# Patient Record
Sex: MALE | Race: WHITE
[De-identification: names, ages, dates, MRNs, and addresses within clinical notes are randomized per-mention and may not be internally consistent; named-entity substitution may affect disease eponyms.]

---

## 2019-12-05 ENCOUNTER — HOSPITAL ENCOUNTER (OUTPATIENT)
Dept: HOSPITAL 25 - ED | Age: 64
Setting detail: OBSERVATION
LOS: 1 days | Discharge: HOME | End: 2019-12-06
Attending: HOSPITALIST | Admitting: HOSPITALIST
Payer: MEDICARE

## 2019-12-05 DIAGNOSIS — S01.91XA: ICD-10-CM

## 2019-12-05 DIAGNOSIS — Z79.82: ICD-10-CM

## 2019-12-05 DIAGNOSIS — G54.0: ICD-10-CM

## 2019-12-05 DIAGNOSIS — S09.90XA: Primary | ICD-10-CM

## 2019-12-05 DIAGNOSIS — E78.5: ICD-10-CM
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
## 2019-12-05 DIAGNOSIS — Z87.442: ICD-10-CM
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
## 2019-12-05 DIAGNOSIS — R90.89: ICD-10-CM

## 2019-12-05 DIAGNOSIS — G47.33: ICD-10-CM

## 2019-12-05 DIAGNOSIS — G43.909: ICD-10-CM

## 2019-12-05 DIAGNOSIS — M19.90: ICD-10-CM

## 2019-12-05 DIAGNOSIS — G47.00: ICD-10-CM

## 2019-12-05 DIAGNOSIS — K21.9: ICD-10-CM

## 2019-12-05 DIAGNOSIS — E11.9: ICD-10-CM

## 2019-12-05 DIAGNOSIS — Z79.899: ICD-10-CM

## 2019-12-05 DIAGNOSIS — N40.0: ICD-10-CM

## 2019-12-05 DIAGNOSIS — J45.909: ICD-10-CM

## 2019-12-05 DIAGNOSIS — G89.29: ICD-10-CM

## 2019-12-05 DIAGNOSIS — R93.89: ICD-10-CM

## 2019-12-05 LAB
ALBUMIN SERPL BCG-MCNC: 4.2 G/DL (ref 3.2–5.2)
ALBUMIN/GLOB SERPL: 1.8 {RATIO} (ref 1–3)
ALP SERPL-CCNC: 77 U/L (ref 34–104)
ALT SERPL W P-5'-P-CCNC: 36 U/L (ref 7–52)
ANION GAP SERPL CALC-SCNC: 5 MMOL/L (ref 2–11)
APTT PPP: 31.5 SECONDS (ref 26–38)
AST SERPL-CCNC: 28 U/L (ref 13–39)
BASOPHILS # BLD AUTO: 0.1 10^3/UL (ref 0–0.2)
BUN SERPL-MCNC: 15 MG/DL (ref 6–24)
BUN/CREAT SERPL: 14.9 (ref 8–20)
CALCIUM SERPL-MCNC: 9.1 MG/DL (ref 8.6–10.3)
CHLORIDE SERPL-SCNC: 108 MMOL/L (ref 101–111)
EOSINOPHIL # BLD AUTO: 0.3 10^3/UL (ref 0–0.6)
GLOBULIN SER CALC-MCNC: 2.3 G/DL (ref 2–4)
GLUCOSE SERPL-MCNC: 101 MG/DL (ref 70–100)
HCO3 SERPL-SCNC: 28 MMOL/L (ref 22–32)
HCT VFR BLD AUTO: 42 % (ref 42–52)
HGB BLD-MCNC: 14.9 G/DL (ref 14–18)
INR PPP/BLD: 1.03 (ref 0.82–1.09)
LYMPHOCYTES # BLD AUTO: 1.8 10^3/UL (ref 1–4.8)
MCH RBC QN AUTO: 33 PG (ref 27–31)
MCHC RBC AUTO-ENTMCNC: 35 G/DL (ref 31–36)
MCV RBC AUTO: 93 FL (ref 80–94)
MONOCYTES # BLD AUTO: 0.6 10^3/UL (ref 0–0.8)
NEUTROPHILS # BLD AUTO: 5 10^3/UL (ref 1.5–7.7)
NRBC # BLD AUTO: 0 10^3/UL
NRBC BLD QL AUTO: 0.1
PLATELET # BLD AUTO: 168 10^3/UL (ref 150–450)
POTASSIUM SERPL-SCNC: 4.3 MMOL/L (ref 3.5–5)
PROT SERPL-MCNC: 6.5 G/DL (ref 6.4–8.9)
RBC # BLD AUTO: 4.54 10^6 /UL (ref 4.18–5.48)
SODIUM SERPL-SCNC: 141 MMOL/L (ref 135–145)
WBC # BLD AUTO: 7.7 10^3/UL (ref 3.5–10.8)

## 2019-12-05 PROCEDURE — 72070 X-RAY EXAM THORAC SPINE 2VWS: CPT

## 2019-12-05 PROCEDURE — 70553 MRI BRAIN STEM W/O & W/DYE: CPT

## 2019-12-05 PROCEDURE — 85610 PROTHROMBIN TIME: CPT

## 2019-12-05 PROCEDURE — 72100 X-RAY EXAM L-S SPINE 2/3 VWS: CPT

## 2019-12-05 PROCEDURE — 96374 THER/PROPH/DIAG INJ IV PUSH: CPT

## 2019-12-05 PROCEDURE — 90471 IMMUNIZATION ADMIN: CPT

## 2019-12-05 PROCEDURE — 90715 TDAP VACCINE 7 YRS/> IM: CPT

## 2019-12-05 PROCEDURE — 85025 COMPLETE CBC W/AUTO DIFF WBC: CPT

## 2019-12-05 PROCEDURE — 96376 TX/PRO/DX INJ SAME DRUG ADON: CPT

## 2019-12-05 PROCEDURE — 12001 RPR S/N/AX/GEN/TRNK 2.5CM/<: CPT

## 2019-12-05 PROCEDURE — 70450 CT HEAD/BRAIN W/O DYE: CPT

## 2019-12-05 PROCEDURE — 70496 CT ANGIOGRAPHY HEAD: CPT

## 2019-12-05 PROCEDURE — 72125 CT NECK SPINE W/O DYE: CPT

## 2019-12-05 PROCEDURE — 85730 THROMBOPLASTIN TIME PARTIAL: CPT

## 2019-12-05 PROCEDURE — A9579 GAD-BASE MR CONTRAST NOS,1ML: HCPCS

## 2019-12-05 PROCEDURE — 80053 COMPREHEN METABOLIC PANEL: CPT

## 2019-12-05 PROCEDURE — 36415 COLL VENOUS BLD VENIPUNCTURE: CPT

## 2019-12-05 PROCEDURE — G0378 HOSPITAL OBSERVATION PER HR: HCPCS

## 2019-12-05 PROCEDURE — 99283 EMERGENCY DEPT VISIT LOW MDM: CPT

## 2019-12-05 RX ADMIN — LEVETIRACETAM SCH MG: 500 TABLET, FILM COATED ORAL at 21:27

## 2019-12-05 RX ADMIN — GABAPENTIN SCH MG: 300 CAPSULE ORAL at 21:27

## 2019-12-05 RX ADMIN — CARBAMAZEPINE SCH MG: 200 TABLET ORAL at 21:27

## 2019-12-05 NOTE — XMS REPORT
Continuity of Care Document (CCD)

 Created on:2019



Patient:Sarwat Reyna

Sex:Male

:1955

External Reference #:[id number]





Demographics







 Address  [address] [place] [address]

 

 Home Phone  [phone]

 

 Mobile Phone  [phone]

 

 Work Phone  [phone]

 

 Email Address  [email or web address]

 

 Preferred Language  Unknown

 

 Marital Status  Unknown

 

 [employer] Affiliation  Unknown

 

 Race  Unknown

 

 Ethnic Group  Unknown









Author







 Name  ANNAMARIE Yan (transmitted by agent of provider Urban Oleary)

 

 Address  [address] [place] [address]









Care Team Providers







 Name  Role  Phone

 

 Jeremy Thurman M.D.  Care Team Information   [phone]









Problems







 Active Problems  Provider  Date

 

 Allergic rhinitis due to pollen  Urban Oleary M.D.  Onset: 2019

 

 Allergic rhinitis due to animals  Urban Oleary M.D.  Onset: 2019

 

 Allergic rhinitis  Urban Oleary M.D.  Onset: 2019







Social History







 Type  Date  Description  Comments

 

 Birth Sex    Unknown  

 

 Tobacco Use  Start: Unknown  Patient has never smoked  

 

 Recreational Drug Use    Denies Drug Use  







Allergies, Adverse Reactions, Alerts







 Description

 

 No Known Drug Allergies







Medications







 Active Medications  SIG  Qnty  Indications  Ordering  Date



         Provider  

 

 Ventolin HFA  2 every 4 hours  18gm  R06.02  Gail Lucio,  2017



         108(90Base)  as needed      FNP-C  



 mcg/Act Aerosol          



           

 

 Levocetirizine  Take 1 Tablet  60tabs  J30.89  Gail Lucio,  2017



 Dihydrochloride  By Mouth Twice      FNP-C  



            5mg Tablets  Daily        



           

 

 Atorvastatin Calcium        Unknown  



                 10mg          



 Tablets          

 

 Metformin HCL ER        Unknown  



             500mg          



 Tablets ER 24HR          



           

 

 Cyclobenzaprine HCL        Unknown  



                5mg          



 Tablets          

 

 Trazodone HCL        Unknown  



          50mg Tablets          



           

 

 Naproxen        Unknown  



     250mg Tablets          



           

 

 Zolpidem Tartrate        Unknown  



              10mg          



 Tablets          

 

 Carbamazepine  (3) Tabs- 600      Unknown  



          200mg Tablets  MG Twice Daily        



           

 

 Amitriptyline HCL        Unknown  



               Tablets          



           

 

 Aspir-81        Unknown  



     81mg Tablets DR          



           

 

 Vitamin D        Unknown  



      1000Unit Tablets          



           

 

 Gabapentin        Unknown  



       300mg Capsules          



           

 

 Losartan Potassium  one tablet once      Unknown  



               25mg  a day        



 Tablets          

 

 Motrin Ib  As needed.      Unknown  



      200mg Capsules          



           







Medications Administered in Office







 Medication  SIG  Qnty  Indications  Ordering Provider  Date

 

 Injection        Allergy Injection  10/17/2019



     Injection          



           

 

 Injection        Allergy Injection  10/09/2019



     Injection          



           

 

 Injection        Allergy Injection  2019



     Injection          



           

 

 Injection        Allergy Injection  2019



     Injection          



           

 

 Injection        Allergy Injection  2019



     Injection          



           

 

 Injection        Allergy Injection  08/15/2019



     Injection          



           

 

 Injection        Allergy Injection  2019



     Injection          



           

 

 Injection        Allergy Injection  2019



     Injection          



           

 

 Injection        Allergy Injection  2019



     Injection          



           

 

 Injection        Allergy Injection  2019



     Injection          



           

 

 Injection        Allergy Injection  2019



     Injection          



           

 

 Injection        Allergy Injection  2019



     Injection          



           

 

 Injection        Allergy Injection  2019



     Injection          



           

 

 Injection        Allergy Injection  2019



     Injection          



           

 

 Injection        Allergy Injection  2019



     Injection          



           

 

 Injection        Allergy Injection  2019



     Injection          



           

 

 Injection        Allergy Injection  2019



     Injection          



           

 

 Injection        Allergy Injection  2019



     Injection          



           

 

 Injection        Allergy Injection  2018



     Injection          



           

 

 Injection        Allergy Injection  2018



     Injection          



           

 

 Injection        Allergy Injection  11/15/2018



     Injection          



           

 

 Injection        Allergy Injection  10/29/2018



     Injection          



           

 

 Injection        Allergy Injection  10/15/2018



     Injection          



           

 

 Injection        Allergy Injection  10/08/2018



     Injection          



           

 

 Injection        Allergy Injection  2018



     Injection          



           

 

 Injection        Allergy Injection  2018



     Injection          



           

 

 Injection        Allergy Injection  2018



     Injection          



           

 

 Injection        Allergy Injection  2018



     Injection          



           

 

 Injection        Urban Oleary M.D.  2018



     Injection          



           

 

 Injection        Allergy Injection  2018



     Injection          



           

 

 Injection        Urban Oleary M.D.  2018



     Injection          



           

 

 Injection        Allergy Injection  2018



     Injection          



           

 

 Injection        Allergy Injection  2018



     Injection          



           

 

 Injection        Allergy Injection  2018



     Injection          



           

 

 Injection        Allergy Injection  2018



     Injection          



           

 

 Injection        Allergy Injection  2018



     Injection          



           

 

 Injection        Allergy Injection  2018



     Injection          



           

 

 Injection        Allergy Injection  2018



     Injection          



           

 

 Injection        Allergy Injection  2018



     Injection          



           

 

 Injection        Allergy Injection  2018



     Injection          



           

 

 Injection        Allergy Injection  2018



     Injection          



           

 

 Injection        Allergy Injection  2018



     Injection          



           

 

 Injection        Allergy Injection  2018



     Injection          



           

 

 Injection        Allergy Injection  2018



     Injection          



           

 

 Injection        Allergy Injection  2018



     Injection          



           

 

 Injection        Allergy Injection  2017



     Injection          



           

 

 Injection        Allergy Injection  2017



     Injection          



           

 

 Injection        Allergy Injection  2017



     Injection          



           

 

 Injection        Allergy Injection  10/23/2017



     Injection          



           

 

 Injection        Allergy Injection  10/02/2017



     Injection          



           

 

 Injection        Allergy Injection  10/02/2017



     Injection          



           

 

 Injection        Urban Oleary M.D.  09/15/2017



     Injection          



           

 

 Injection        Allergy Injection  09/15/2017



     Injection          



           

 

 Injection        Allergy Injection  2017



     Injection          



           

 

 Injection        Allergy Injection  2017



     Injection          



           

 

 Injection        Allergy Injection  2017



     Injection          



           

 

 Injection        Allergy Injection  2017



     Injection          



           

 

 Injection        Allergy Injection  2017



     Injection          



           

 

 Injection        Allergy Injection  2017



     Injection          



           

 

 Injection        Allergy Injection  2017



     Injection          



           

 

 Injection        Urban Oleary M.D.  2017



     Injection          



           

 

 Injection        Allergy Injection  2017



     Injection          



           

 

 Injection        Allergy Injection  2017



     Injection          



           

 

 Injection        Allergy Injection  2017



     Injection          



           

 

 Injection        Allergy Injection  2017



     Injection          



           

 

 Injection        Allergy Injection  2017



     Injection          



           

 

 Injection        Allergy Injection  2017



     Injection          



           

 

 Injection        Allergy Injection  2017



     Injection          



           

 

 Injection        Allergy Injection  2017



     Injection          



           

 

 Injection        Allergy Injection  2017



     Injection          



           

 

 Injection        Allergy Injection  2017



     Injection          



           

 

 Injection        Allergy Injection  2017



     Injection          



           

 

 Injection        Allergy Injection  2017



     Injection          



           

 

 Injection        Allergy Injection  2016



     Injection          



           

 

 Injection        Allergy Injection  2016



     Injection          



           

 

 Injection        Allergy Injection  2016



     Injection          



           

 

 Injection        Allergy Injection  10/17/2016



     Injection          



           

 

 Injection        Allergy Injection  10/03/2016



     Injection          



           

 

 Injection        Allergy Injection  2016



     Injection          



           

 

 Injection        Allergy Injection  2016



     Injection          



           

 

 Injection        Allergy Injection  2016



     Injection          



           

 

 Injection        Allergy Injection  2016



     Injection          



           

 

 Injection        Allergy Injection  2016



     Injection          



           

 

 Injection        Allergy Injection  2016



     Injection          



           

 

 Injection        Allergy Injection  2016



     Injection          



           

 

 Injection        Allergy Injection  2016



     Injection          



           

 

 Injection        Allergy Injection  2016



     Injection          



           

 

 Injection        Allergy Injection  2016



     Injection          



           

 

 Injection        Allergy Injection  2016



     Injection          



           

 

 Injection        Allergy Injection  2016



     Injection          



           

 

 Injection        Allergy Injection  2016



     Injection          



           

 

 Injection        Allergy Injection  2016



     Injection          



           

 

 Injection        Allergy Injection  2016



     Injection          



           

 

 Injection        Allergy Injection  2015



     Injection          



           

 

 Injection        Allergy Injection  2015



     Injection          



           

 

 Injection        Allergy Injection  2015



     Injection          



           

 

 Injection        Allergy Injection  10/21/2015



     Injection          



           

 

 Injection        Allergy Injection  10/07/2015



     Injection          



           

 

 Injection        Allergy Injection  2015



     Injection          



           

 

 Injection        Allergy Injection  2015



     Injection          



           

 

 Injection        Allergy Injection  2015



     Injection          



           

 

 Injection        Allergy Injection  08/10/2015



     Injection          



           

 

 Injection        Allergy Injection  2015



     Injection          



           

 

 Injection        Allergy Injection  07/10/2015



     Injection          



           

 

 Injection        Allergy Injection  2015



     Injection          



           

 

 Injection        Allergy Injection  2015



     Injection          



           

 

 Injection        Allergy Injection  2015



     Injection          



           

 

 Injection        Allergy Injection  2015



     Injection          



           

 

 Injection        Allergy Injection  2015



     Injection          



           

 

 Injection        Allergy Injection  04/10/2015



     Injection          



           

 

 Injection        Allergy Injection  2015



     Injection          



           

 

 Injection        Allergy Injection  2015



     Injection          



           

 

 Injection        Allergy Injection  2015



     Injection          



           

 

 Injection        Allergy Injection  2015



     Injection          



           

 

 Injection        Allergy Injection  2015



     Injection          



           

 

 Injection        Allergy Injection  12/15/2014



     Injection          



           

 

 Injection        Allergy Injection  2014



     Injection          



           

 

 Injection        Allergy Injection  10/20/2014



     Injection          



           

 

 Injection        Allergy Injection  10/08/2014



     Injection          



           

 

 Injection        Allergy Injection  2014



     Injection          



           

 

 Injection        Allergy Injection  2014



     Injection          



           

 

 Injection        Allergy Injection  2014



     Injection          



           

 

 Injection        Allergy Injection  2014



     Injection          



           

 

 Injection        Allergy Injection  2014



     Injection          



           

 

 Injection        Allergy Injection  2014



     Injection          



           

 

 Injection        Allergy Injection  2014



     Injection          



           

 

 Injection        Allergy Injection  2014



     Injection          



           

 

 Injection        Allergy Injection  2014



     Injection          



           

 

 Injection        Allergy Injection  2014



     Injection          



           

 

 Injection        Allergy Injection  2014



     Injection          



           

 

 Injection        Allergy Injection  2014



     Injection          



           

 

 Injection        Allergy Injection  2014



     Injection          



           

 

 Injection        Allergy Injection  2014



     Injection          



           

 

 Injection        Allergy Injection  2014



     Injection          



           

 

 Injection        Allergy Injection  2014



     Injection          



           

 

 Injection        Allergy Injection  2013



     Injection          



           

 

 Injection        Allergy Injection  2013



     Injection          



           

 

 Injection        Allergy Injection  2013



     Injection          



           

 

 Injection        Allergy Injection  10/16/2013



     Injection          



           

 

 Injection        Allergy Injection  10/02/2013



     Injection          



           

 

 Injection        Allergy Injection  2013



     Injection          



           

 

 Injection        Allergy Injection  2013



     Injection          



           

 

 Injection        Allergy Injection  2013



     Injection          



           

 

 Injection        Allergy Injection  2013



     Injection          



           

 

 Injection        Allergy Injection  2013



     Injection          



           

 

 Injection        Allergy Injection  2013



     Injection          



           

 

 Injection        Allergy Injection  2013



     Injection          



           

 

 Injection        Allergy Injection  2013



     Injection          



           

 

 Injection        Allergy Injection  2013



     Injection          



           

 

 Injection        Allergy Injection  05/15/2013



     Injection          



           

 

 Injection        Allergy Injection  2013



     Injection          



           

 

 Injection        Allergy Injection  2013



     Injection          



           

 

 Injection        Allergy Injection  2013



     Injection          



           

 

 Injection        Allergy Injection  2013



     Injection          



           

 

 Injection        Mic Rubinstein, M.DDeann  2013



     Injection          



           

 

 Injection        Mic Rubinstein, M.DDeann  2013



     Injection          



           

 

 Injection        Mic Rubinstein, M.DDeann  2013



     Injection          



           

 

 Injection        Mic Rubinstein, M.DDeann  2013



     Injection          



           

 

 Injection        Mic Rubinstein, M.DDeann  2013



     Injection          



           

 

 Injection        Mic Rubinstein, M.DDeann  2013



     Injection          



           

 

 Injection        Mic Rubinstein, M.DDeann  2012



     Injection          



           

 

 Injection        Mic Rubinstein, M.DDeann  2012



     Injection          



           

 

 Injection        Mic Rubinstein, M.DDeann  2012



     Injection          



           

 

 Injection        Mic Rubinstein, M.DDeann  2012



     Injection          



           

 

 Injection        Mic Rubinstein, M.DDeann  10/19/2012



     Injection          



           

 

 Injection        Mic Rubinstein, M.DDeann  2012



     Injection          



           

 

 Injection        Mic Rubinstein, YUMI.DDeann  2012



     Injection          



           

 

 Injection        Mic Rubinstein, M.DDeann  2012



     Injection          



           

 

 Injection        Mic Rubinstein, M.DDeann  2012



     Injection          



           

 

 Injection        Mic Rubinstein, M.DDeann  2012



     Injection          



           

 

 Injection        Mic Rubinstein, M.DDeann  2012



     Injection          



           

 

 Injection        Mic Rubinstein, M.DDeann  2012



     Injection          



           

 

 Injection        Mic Rubinstein, M.DDeann  2012



     Injection          



           

 

 Injection        Mic Rubinstein, M.DDeann  2012



     Injection          



           

 

 Injection        Mic Rubinstein, M.DDeann  2012



     Injection          



           

 

 Injection        Mic Rubinstein, M.DDeann  2012



     Injection          



           

 

 Injection        Mic Rubinstein, M.DDeann  2012



     Injection          



           

 

 Injection        Mic Rubinstein, M.DDeann  2012



     Injection          



           

 

 Injection        Mic Rubinstein, M.DDeann  2012



     Injection          



           

 

 Injection        Mic Rubinstein, M.DDeann  2012



     Injection          



           

 

 Injection        Mic Rubinstein, M.DDeann  2012



     Injection          



           

 

 Injection        Mic Rubinstein, M.DDeann  2011



     Injection          



           

 

 Injection        Mic Rubinstein, M.DDeann  2011



     Injection          



           

 

 Injection        Mic Rubinstein, M.DDeann  2011



     Injection          



           

 

 Injection        Mic Rubinstein, M.DDeann  10/21/2011



     Injection          



           

 

 Injection        Mic Rubinstein, M.DDeann  10/05/2011



     Injection          



           

 

 Injection        Mic Rubinstein, M.DDeann  2011



     Injection          



           

 

 Injection        Mic Rubinstein, M.DDeann  2011



     Injection          



           

 

 Injection        Mic Rubinstein, M.DDeann  2011



     Injection          



           

 

 Injection        Mic Rubinstein, M.DDeann  08/10/2011



     Injection          



           

 

 Injection        Mic Rubinstein, M.DDeann  2011



     Injection          



           

 

 Injection        Mic Rubinstein, M.DDeann  2011



     Injection          



           

 

 Injection        Mic Rubinstein, M.DDeann  2011



     Injection          



           

 

 Injection        Mic Rubinstein, YUMI.DDeann  06/15/2011



     Injection          



           

 

 Injection        Mic Rubinstein, YUMI.DDeann  2011



     Injection          



           

 

 Injection        Mic Rubinstein, YUMI.DDeann  2011



     Injection          



           

 

 Injection        Mic Rubinstein, M.DDeann  2011



     Injection          



           

 

 Injection        Mic Rubinstein, M.DDeann  2011



     Injection          



           

 

 Injection        Mic Rubinstein, M.DDeann  2011



     Injection          



           

 

 Injection        Mic Rubinstein, M.DDeann  2011



     Injection          



           

 

 Injection        Mic Rubinstein, M.DDeann  2011



     Injection          



           

 

 Injection        Mic Rubinstein, M.DDeann  2011



     Injection          



           

 

 Injection        Mic Rubinstein, M.DDeann  2011



     Injection          



           

 

 Injection        Mic Rubinstein, M.DDeann  2011



     Injection          



           

 

 Injection        Mic Rubinstein, M.DDeann  2010



     Injection          



           

 

 Injection        Mic Rubinstein, M.DDeann  2010



     Injection          



           

 

 Injection        Mic Rubinstein, M.DDeann  2010



     Injection          



           

 

 Injection        Mic Rubinstein, M.DDeann  10/20/2010



     Injection          



           

 

 Injection        Mic Rubinstein, M.DDeann  10/06/2010



     Injection          



           

 

 Injection        Mic Rubinstein, M.DDeann  2010



     Injection          



           

 

 Injection        Mic Rubinstein, M.DDeann  09/10/2010



     Injection          



           

 

 Injection        Mic Rubinstein, M.DDeann  2010



     Injection          



           

 

 Injection        Mic Rubinstein, M.DDeann  2010



     Injection          



           

 

 Injection        Mic Rubinstein, MDeannDDeann  2010



     Injection          



           

 

 Injection        Mic Rubinstein, MDeannDDeann  2010



     Injection          



           

 

 Injection        Mic Rubinstein, MDeannDDeann  2010



     Injection          



           

 

 Injection        Mic Rubinstein, M.DDeann  2010



     Injection          



           

 

 Injection        Mic Rubinstein, NESSADDeann  2010



     Injection          



           

 

 Injection        Mic Rubinstein, MDeannDDeann  2010



     Injection          



           

 

 Injection        Mic Rubinstein, NESSADDeann  2010



     Injection          



           

 

 Injection        Mic Rubinstein, NESSADDeann  2010



     Injection          



           

 

 Injection        Mic Rubinstein, M.D.  2010



     Injection          



           

 

 Injection        Mic Rubinstein, NESSADDeann  2010



     Injection          



           

 

 Injection        Mic Rubinstein, NESSADDeann  2010



     Injection          



           

 

 Injection        Kirit Haney M.D.  2010



     Injection          



           

 

 Injection        Kirit Haney M.D.  2010



     Injection          



           

 

 Injection        Mic Rubinstein, M.D.  2009



     Injection          



           

 

 Injection        Mic Rubinstein, M.D.  2009



     Injection          



           

 

 Injection        Mic Rubinstein, M.D.  2009



     Injection          



           

 

 Injection        Mic Rubinstein, M.D.  2009



     Injection          



           

 

 Injection        Mic Rubinstein, MDeannDDeann  10/14/2009



     Injection          



           

 

 Injection        Mic Rubinstein, M.D.  2009



     Injection          



           

 

 Injection        Mic Rubinstein, M.D.  2009



     Injection          



           

 

 Injection        Mic Rubinstein, M.DDeann  2009



     Injection          



           

 

 Injection        Mic Rubinstein, M.DDeann  2009



     Injection          



           

 

 Injection        Mic Rubinstein, MDeannDDeann  2009



     Injection          



           

 

 Injection        Mic Rubinstein, M.DDeann  2009



     Injection          



           

 

 Injection        Mic Rubinstein, M.DDeann  07/10/2009



     Injection          



           

 

 Injection        Mic Rubinstein, M.DDeann  2009



     Injection          



           

 

 Injection        Mic Rubinstein, MNAA  2009



     Injection          



           

 

 Injection        Mic Rubinstein, M.D.  2009



     Injection          



           

 

 Injection        Mic Rubinstein, M.D.  05/15/2009



     Injection          



           

 

 Injection        Mic Rubinstein, M.D.  2009



     Injection          



           

 

 Injection        Mic Rubinstein, M.D.  2009



     Injection          



           

 

 Injection        Mic Rubinstein, M.D.  2009



     Injection          



           

 

 Injection        Mic Rubinstein, M.D.  2009



     Injection          



           

 

 Injection        Mic Rubinstein, M.D.  2009



     Injection          



           

 

 Injection        Mic Rubinstein, M.D.  2009



     Injection          



           

 

 Injection        Mic Rubinstein, M.D.  2009



     Injection          



           

 

 Injection        Mic Rubinstein, M.DDeann  2009



     Injection          



           

 

 Injection        Mic Rubinstein, M.DDeann  12/10/2008



     Injection          



           

 

 Injection        Mic Rubinstein, M.D.  2008



     Injection          



           

 

 Injection        Mic Rubinstein, M.D.  10/27/2008



     Injection          



           

 

 Injection        Mic Rubinstein, M.DDeann  10/15/2008



     Injection          



           

 

 Injection        Mic Rubinstein, M.DDeann  10/03/2008



     Injection          



           

 

 Injection        Mic Rubinstein, M.DDeann  2008



     Injection          



           

 

 Injection        Mic Rubinstein, M.D.  2008



     Injection          



           

 

 Injection        Mic Rubinstein, M.DDeann  2008



     Injection          



           

 

 Injection        Mic Rubinstein, M.D.  2008



     Injection          



           

 

 Injection        Mic Rubinstein, M.D.  2008



     Injection          



           

 

 Injection        Mic Rubinstein, M.DDeann  2008



     Injection          



           

 

 Injection        Mic Rubinstein, M.D.  2008



     Injection          



           

 

 Injection        Mic Rubinstein, M.D.  2008



     Injection          



           

 

 Injection        Mic Rubinstein, M.DDeann  2008



     Injection          



           

 

 Injection        Mic Rubinstein, M.DDeann  2008



     Injection          



           

 

 Injection        Mic Rubinstein, M.D.  2008



     Injection          



           

 

 Injection        Mic Rubinstein, M.D.  2008



     Injection          



           

 

 Injection        Mic Rubinstein, M.DDeann  2008



     Injection          



           

 

 Injection        Mic Rubinstein, M.DDeann  2008



     Injection          



           

 

 Injection        Mic Rubinstein, M.DDeann  2008



     Injection          



           

 

 Injection        Mic Rubinstein, M.DDeann  2008



     Injection          



           

 

 Injection        Mic Rubinstein, M.DDeann  2008



     Injection          



           

 

 Injection        Mic Rubinstein, M.DDeann  2008



     Injection          



           

 

 Injection        Mic Rubinstein, M.DDaenn  2007



     Injection          



           

 

 Injection        Mic Rubinstein, M.DDeann  2007



     Injection          



           

 

 Injection        Mic Rubinstein, M.DDeann  2007



     Injection          



           

 

 Injection        Mic Rubinstein, M.DDeann  2007



     Injection          



           

 

 Injection        Mic Rubinstein, M.DDeann  10/17/2007



     Injection          



           

 

 Injection        Mic Rubinstein, M.DDeann  10/03/2007



     Injection          



           

 

 Injection        Mic Rubinstein, M.DDeann  2007



     Injection          



           

 

 Injection        Mic Rubinstein, M.DDeann  2007



     Injection          



           

 

 Injection        Mic Rubinstein, M.DDeann  2007



     Injection          



           

 

 Injection        Mic Rubinstein, M.DDeann  08/10/2007



     Injection          



           

 

 Injection        Mic Rubinstein, M.DDeann  2007



     Injection          



           

 

 Injection        Mic Rubinstein, M.DDeann  2007



     Injection          



           

 

 Injection        Mic Rubinstein, M.DDeann  2007



     Injection          



           

 

 Injection        Mic Rubinstein, M.DDeann  06/15/2007



     Injection          



           

 

 Injection        Mic Rubinstein, M.DDeann  2007



     Injection          



           

 

 Injection        Mic Rubinstein, M.DDeann  2007



     Injection          



           

 

 Injection        Mic Rubinstein, M.DDeann  2007



     Injection          



           

 

 Injection        Mic Rubinstein, M.DDeann  2007



     Injection          



           

 

 Injection        Mic Rubinstein, M.DDeann  2007



     Injection          



           

 

 Injection        Mic Rubinstein, M.DDeann  2007



     Injection          



           

 

 Injection        Mic Rubinstein, M.DDeann  2007



     Injection          



           

 

 Injection        Mic Rubinstein, M.DDeann  2007



     Injection          



           

 

 Injection        Mic Rubinstein, M.DDeann  2007



     Injection          



           

 

 Injection        Mic Rubinstein, M.DDeann  01/10/2007



     Injection          



           

 

 Injection        Mic Rubinstein, M.DDeann  2006



     Injection          



           

 

 Injection        Mic Rubinstein, M.DDeann  2006



     Injection          



           

 

 Injection        Mic Rubinstein, M.DDeann  2006



     Injection          



           

 

 Injection        Mic Rubinstein, M.DDeann  10/23/2006



     Injection          



           

 

 Injection        Mic Rubinstein, M.DDeann  10/09/2006



     Injection          



           

 

 Injection        Mic Rubinstein, M.DDeann  2006



     Injection          



           

 

 Injection        Mic Rubinstein, M.DDeann  2006



     Injection          



           

 

 Injection        Mic Rubinstein, M.DDeann  2006



     Injection          



           

 

 Injection        Mic Rubinstein, M.DDeann  2006



     Injection          



           

 

 Injection        Mic Rubinstein, M.DDeann  2006



     Injection          



           

 

 Injection        Mic Rubinstein, M.DDeann  2006



     Injection          



           

 

 Injection        Mic Rubinstein, M.DDeann  2006



     Injection          



           

 

 Injection        Mic Rubinstein, M.DDeann  2006



     Injection          



           

 

 Injection        Mic Rubinstein, M.DDeann  2006



     Injection          



           

 

 Injection        Mic Rubinstein, M.DDeann  2006



     Injection          



           

 

 Injection        Mic Rubinstein, YUMI.DDeann  05/10/2006



     Injection          



           

 

 Injection        Mic Rubinstein, YUMI.DDeann  2006



     Injection          



           

 

 Injection        Mic Rubinstein, YUMI.DDeann  04/10/2006



     Injection          



           

 

 Injection        Mic Rubinstein, M.DDeann  2006



     Injection          



           

 

 Injection        Mic Rubinstein, M.DDeann  03/10/2006



     Injection          



           

 

 Injection        Mic Rubinstein, M.DDeann  2006



     Injection          



           

 

 Injection        Mic Rubinstein, M.DDeann  2006



     Injection          



           

 

 Injection        Mic Rubinstein, YUMI.DDeann  2006



     Injection          



           

 

 Injection        Mic Rubinstein, M.DDeann  2005



     Injection          



           

 

 Injection        Mic Rubinstein, M.DDeann  2005



     Injection          



           

 

 Injection        Mic Rubinstein, M.DDeann  2005



     Injection          



           

 

 Injection        Mic Rubinstein, M.DDeann  2005



     Injection          



           

 

 Injection        Mic Rubinstein, M.DDeann  10/19/2005



     Injection          



           

 

 Injection        Mic Rubinstein, M.DDeann  10/07/2005



     Injection          



           

 

 Injection        Mic Rubinstein, M.DDeann  2005



     Injection          



           

 

 Injection        Mic Rubinstein, M.DDeann  2005



     Injection          



           

 

 Injection        Mic Rubinstein, M.DDeann  2005



     Injection          



           

 

 Injection        Mic Rubinstein, M.D.  08/15/2005



     Injection          



           

 

 Injection        Mic Rubinstein, M.D.  2005



     Injection          



           

 

 Injection        Mic Rubinstein, M.D.  2005



     Injection          



           

 

 Injection        Mic Rubinstein, M.D.  2005



     Injection          



           

 

 Injection        Mic Rubinstein, M.DDeann  2005



     Injection          



           

 

 Injection        Mic Rubinstein, M.DDeann  2005



     Injection          



           

 

 Injection        Mic Rubinstein, M.DDeann  2005



     Injection          



           

 

 Injection        Mic Rubinstein, M.DDeann  2005



     Injection          



           

 

 Injection        Mic Rubinstein, M.DDeann  2005



     Injection          



           

 

 Injection        Mic Rubinstein, M.DDeann  2005



     Injection          



           

 

 Injection        Mic Rubinstein, M.DDeann  2005



     Injection          



           

 

 Injection        Mic Rubinstein, M.DDeann  2005



     Injection          



           

 

 Injection        Mic Rubinstein, M.DDeann  2005



     Injection          



           

 

 Injection        Mic Rubinstein, M.DDeann  2005



     Injection          



           

 

 Injection        Mic Rubinstein, M.DDeann  2005



     Injection          



           

 

 Injection        Mic Rubinstein, M.DDeann  2005



     Injection          



           

 

 Injection        Mic Rubinstein, M.DDeann  2004



     Injection          



           

 

 Injection        Mic Rubinstein, M.DDeann  2004



     Injection          



           

 

 Injection        Mic Rubinstein, M.DDeann  2004



     Injection          



           

 

 Injection        Mic Rubinstein, M.DDeann  10/22/2004



     Injection          



           

 

 Injection        Mic Rubinstein, M.DDeann  10/04/2004



     Injection          



           

 

 Injection        Mic Rubinstein, M.DDeann  2004



     Injection          



           

 

 Injection        Mic Rubinstein, M.DDeann  2004



     Injection          



           

 

 Injection        Mic Rubinstein, M.DDeann  2004



     Injection          



           

 

 Injection        Mic Rubinstein, M.DDeann  2004



     Injection          



           

 

 Injection        Mic Rubinstein, M.DDeann  2004



     Injection          



           

 

 Injection        Mic Rubinstein, M.DDeann  2004



     Injection          



           

 

 Injection        Mic Rubinstein, M.DDeann  2004



     Injection          



           

 

 Injection        Mic Rubinstein, M.DDeann  2004



     Injection          



           

 

 Injection        Mic Rubinstein, M.DDeann  2004



     Injection          



           

 

 Injection        Mic Rubinstein, M.D.  05/10/2004



     Injection          



           

 

 Injection        Mic Rubinstein, M.D.  2004



     Injection          



           

 

 Injection        Mic RubinsteinJULIANNA hudson  2004



     Injection          



           

 

 Injection        Mic RubinsteinJULIANNA hudson  2004



     Injection          



           

 

 Injection        Mic RubinsteinJULIANNA hudson  2004



     Injection          



           

 

 Injection        Mic Rubinstein, M.D.  2004



     Injection          



           

 

 Injection        Mic RubinsteinJULIANNA hudson  2004



     Injection          



           

 

 Injection        Mic Rubinstein, M.D.  2004



     Injection          



           

 

 Injection        Mic Rubinstein, M.D.  2003



     Injection          



           

 

 Injection        Mic Rubinstein, M.D.  2003



     Injection          



           

 

 Injection        Mic Rubinstein, M.D.  10/24/2003



     Injection          



           

 

 Injection        Mic Rubinstein, M.D.  10/13/2003



     Injection          



           

 

 Injection        Mict Rubinstein, M.D.  2003



     Injection          



           

 

 Injection        Mict Rubinstein, M.D.  2003



     Injection          



           

 

 Injection        Mic Rubinstein, M.D.  2003



     Injection          



           

 

 Injection        Mict Rubinstein, M.D.  2003



     Injection          



           

 

 Injection        Elliot Rubinstein, M.D.  2003



     Injection          



           

 

 Injection        Elliot Rubinstein, M.D.  2003



     Injection          



           







Immunizations







 CPT Code  Status  Date  Vaccine  Lot #

 

 86050  Given  Unknown  DTaP Vaccine Younger Than 7  

 

 65739  Given  Unknown  Influenza Virus Vaccine, Quadrivalent, Split,  



       Preservative Free  

 

 23539  Given  Unknown  Influenza Vaccine  

 

 89676  Given  Unknown  Influenza Vaccine 3 Years Old +  







Vital Signs







 Date  Vital  Result  Comment

 

 10/24/2019  9:02am  Height  65 inches  5'5"









 Weight  250.00 lb  

 

 Weight  113.400 kg  

 

 Respiratory Rate  18 /min  

 

 Heart Rate  62 /min  

 

 O2 % BldC Oximetry  97 %  

 

 BP Systolic  109 mmHg  

 

 BP Diastolic  65 mmHg  

 

 BMI (Body Mass Index)  41.6 kg/m2  









 2017  1:21pm  Height  65 inches  5'5"









 Weight  257.00 lb  

 

 Weight  116.575 kg  

 

 Respiratory Rate  20 /min  

 

 Heart Rate  94 /min  

 

 O2 % BldC Oximetry  77 %  

 

 BP Systolic  133 mmHg  

 

 BP Diastolic  79 mmHg  

 

 BMI (Body Mass Index)  42.8 kg/m2  







Results







 Description

 

 No Information Available







Procedures







 Date  Code  Description  Status

 

 10/24/2019  75411  Pre PFT  Completed

 

 10/17/2019  50523  Injection  Completed

 

 10/09/2019  50419  Injection  Completed

 

 2019  14073  Injection  Completed

 

 2019  78760  Injection  Completed

 

 2019  92556  Injection  Completed

 

 2019  36291  Extract 1-10  Completed

 

 08/15/2019  12847  Injection  Completed

 

 2019  60341  Injection  Completed

 

 2019  38372  Injection  Completed

 

 2019  17654  Injection  Completed

 

 2019  57655  Injection  Completed

 

 2019  93738  Injection  Completed

 

 2019  66300  Injection  Completed







[employer]







 Description

 

 No Information Available







Encounters







 Type  Date  Location  Provider  Dx  Diagnosis

 

 Office Visit  10/24/2019  Roc Lucio,  J30.1  Allergic rhinitis 
due



   9:00a    FNP-C    to pollen









 J30.89  Other allergic rhinitis

 

 J30.2  Other seasonal allergic rhinitis

 

 J30.81  Allergic rhinitis due to animal (cat) (dog) hair and dander







Assessments







 Date  Code  Description  Provider

 

 10/24/2019  J30.1  Allergic rhinitis due to pollen  Gail Uldrich, FNP-C

 

 10/24/2019  J30.89  Other allergic rhinitis  Gail Uldrich, FNP-C

 

 10/24/2019  J30.2  Other seasonal allergic rhinitis  Gail Uldrich, FNP-C

 

 10/24/2019  J30.81  Allergic rhinitis due to animal (cat) (dog)  Gail 
Uldrich, FNP-C



     hair and dander  

 

 10/17/2019  J30.1  Allergic rhinitis due to pollen  Urban Oleary M.D.

 

 10/17/2019  J30.1  Allergic rhinitis due to pollen  Allergy Injection

 

 10/17/2019  J30.89  Other allergic rhinitis  Urban Oleary M.D.

 

 10/17/2019  J30.89  Other allergic rhinitis  Allergy Injection

 

 10/17/2019  J30.2  Other seasonal allergic rhinitis  Urban Oleary M.D.

 

 10/17/2019  J30.2  Other seasonal allergic rhinitis  Allergy Injection

 

 10/17/2019  J30.81  Allergic rhinitis due to animal (cat) (dog)  Urban Oleary, 
M.D.



     hair and dander  

 

 10/17/2019  J30.81  Allergic rhinitis due to animal (cat) (dog)  Allergy 
Injection



     hair and dander  

 

 10/09/2019  J30.1  Allergic rhinitis due to pollen  Urban Oleary M.D.

 

 10/09/2019  J30.1  Allergic rhinitis due to pollen  Allergy Injection

 

 10/09/2019  J30.89  Other allergic rhinitis  Urban Oleary M.D.

 

 10/09/2019  J30.89  Other allergic rhinitis  Allergy Injection

 

 10/09/2019  J30.2  Other seasonal allergic rhinitis  Urban Oleary M.D.

 

 10/09/2019  J30.2  Other seasonal allergic rhinitis  Allergy Injection

 

 10/09/2019  J30.81  Allergic rhinitis due to animal (cat) (dog)  Urban Oleary M.D.



     hair and dander  

 

 10/09/2019  J30.81  Allergic rhinitis due to animal (cat) (dog)  Allergy 
Injection



     hair and dander  

 

 2019  J30.1  Allergic rhinitis due to pollen  Urban Oleary M.D.

 

 2019  J30.1  Allergic rhinitis due to pollen  Allergy Injection

 

 2019  J30.89  Other allergic rhinitis  Urban Oleary M.D.

 

 2019  J30.89  Other allergic rhinitis  Allergy Injection

 

 2019  J30.2  Other seasonal allergic rhinitis  Urban Oleary M.D.

 

 2019  J30.2  Other seasonal allergic rhinitis  Allergy Injection

 

 2019  J30.81  Allergic rhinitis due to animal (cat) (dog)  Urban Oleary M.D.



     hair and dander  

 

 2019  J30.81  Allergic rhinitis due to animal (cat) (dog)  Allergy 
Injection



     hair and dander  

 

 2019  J30.1  Allergic rhinitis due to pollen  Urban Oleary M.D.

 

 2019  J30.1  Allergic rhinitis due to pollen  Allergy Injection

 

 2019  J30.89  Other allergic rhinitis  Urban Oleary M.D.

 

 2019  J30.89  Other allergic rhinitis  Allergy Injection

 

 2019  J30.2  Other seasonal allergic rhinitis  Urban Oleary M.D.

 

 2019  J30.2  Other seasonal allergic rhinitis  Allergy Injection

 

 2019  J30.81  Allergic rhinitis due to animal (cat) (dog)  Urban Oleary M.D.



     hair and dander  

 

 2019  J30.81  Allergic rhinitis due to animal (cat) (dog)  Allergy 
Injection



     hair and dander  

 

 2019  J30.1  Allergic rhinitis due to pollen  Urban Oleary M.D.

 

 2019  J30.1  Allergic rhinitis due to pollen  Allergy Injection

 

 2019  J30.89  Other allergic rhinitis  Urban Oleary M.D.

 

 2019  J30.89  Other allergic rhinitis  Allergy Injection

 

 2019  J30.2  Other seasonal allergic rhinitis  Urban Oleary M.D.

 

 2019  J30.2  Other seasonal allergic rhinitis  Allergy Injection

 

 2019  J30.81  Allergic rhinitis due to animal (cat) (dog)  Urban Oleary M.D.



     hair and dander  

 

 2019  J30.81  Allergic rhinitis due to animal (cat) (dog)  Allergy 
Injection



     hair and dander  

 

 2019  J30.1  Allergic rhinitis due to pollen  Urban Oleary M.D.

 

 2019  J30.1  Allergic rhinitis due to pollen  Lab

 

 2019  J30.2  Other seasonal allergic rhinitis  Urban Oleary M.D.

 

 2019  J30.2  Other seasonal allergic rhinitis  Lab

 

 2019  J30.81  Allergic rhinitis due to animal (cat) (dog)  Urban Oleary M.D.



     hair and dander  

 

 2019  J30.81  Allergic rhinitis due to animal (cat) (dog)  Lab



     hair and dander  

 

 2019  J30.89  Other allergic rhinitis  Urban Oleary M.D.

 

 2019  J30.89  Other allergic rhinitis  Lab

 

 08/15/2019  J30.1  Allergic rhinitis due to pollen  Urban Oleary M.D.

 

 08/15/2019  J30.1  Allergic rhinitis due to pollen  Allergy Injection

 

 08/15/2019  J30.89  Other allergic rhinitis  Urban Oleary M.D.

 

 08/15/2019  J30.89  Other allergic rhinitis  Allergy Injection

 

 08/15/2019  J30.2  Other seasonal allergic rhinitis  Urban Oleary M.D.

 

 08/15/2019  J30.2  Other seasonal allergic rhinitis  Allergy Injection

 

 08/15/2019  J30.81  Allergic rhinitis due to animal (cat) (dog)  Urban Oleary M.D.



     hair and dander  

 

 08/15/2019  J30.81  Allergic rhinitis due to animal (cat) (dog)  Allergy 
Injection



     hair and dander  

 

 2019  J30.1  Allergic rhinitis due to pollen  Urban Oleary M.D.

 

 2019  J30.1  Allergic rhinitis due to pollen  Allergy Injection

 

 2019  J30.89  Other allergic rhinitis  Urban Oleary M.D.

 

 2019  J30.89  Other allergic rhinitis  Allergy Injection

 

 2019  J30.2  Other seasonal allergic rhinitis  Urban Oleary M.D.

 

 2019  J30.2  Other seasonal allergic rhinitis  Allergy Injection

 

 2019  J30.81  Allergic rhinitis due to animal (cat) (dog)  Urban Oleary M.D.



     hair and dander  

 

 2019  J30.81  Allergic rhinitis due to animal (cat) (dog)  Allergy 
Injection



     hair and dander  

 

 2019  J30.1  Allergic rhinitis due to pollen  Urban Oleary M.D.

 

 2019  J30.1  Allergic rhinitis due to pollen  Allergy Injection

 

 2019  J30.89  Other allergic rhinitis  Urban Oleary M.D.

 

 2019  J30.89  Other allergic rhinitis  Allergy Injection

 

 2019  J30.2  Other seasonal allergic rhinitis  Urban Oleary M.D.

 

 2019  J30.2  Other seasonal allergic rhinitis  Allergy Injection

 

 2019  J30.81  Allergic rhinitis due to animal (cat) (dog)  Urban Oleary M.D.



     hair and dander  

 

 2019  J30.81  Allergic rhinitis due to animal (cat) (dog)  Allergy 
Injection



     hair and dander  

 

 2019  J30.1  Allergic rhinitis due to pollen  Urban Oleary M.D.

 

 2019  J30.1  Allergic rhinitis due to pollen  Allergy Injection

 

 2019  J30.89  Other allergic rhinitis  Urban Oleary M.D.

 

 2019  J30.89  Other allergic rhinitis  Allergy Injection

 

 2019  J30.2  Other seasonal allergic rhinitis  Urban Oleary M.D.

 

 2019  J30.2  Other seasonal allergic rhinitis  Allergy Injection

 

 2019  J30.81  Allergic rhinitis due to animal (cat) (dog)  Urban Oleary M.D.



     hair and dander  

 

 2019  J30.81  Allergic rhinitis due to animal (cat) (dog)  Allergy 
Injection



     hair and dander  

 

 2019  J30.1  Allergic rhinitis due to pollen  Urban Oleary M.D.

 

 2019  J30.1  Allergic rhinitis due to pollen  Allergy Injection

 

 2019  J30.89  Other allergic rhinitis  Urban Oleary M.D.

 

 2019  J30.89  Other allergic rhinitis  Allergy Injection

 

 2019  J30.2  Other seasonal allergic rhinitis  Urban Oleary M.D.

 

 2019  J30.2  Other seasonal allergic rhinitis  Allergy Injection

 

 2019  J30.81  Allergic rhinitis due to animal (cat) (dog)  Urban Oleary M.D.



     hair and dander  

 

 2019  J30.81  Allergic rhinitis due to animal (cat) (dog)  Allergy 
Injection



     hair and dander  

 

 2019  J30.1  Allergic rhinitis due to pollen  Urban Oleary M.D.

 

 2019  J30.1  Allergic rhinitis due to pollen  Allergy Injection

 

 2019  J30.89  Other allergic rhinitis  Urban Oleary M.D.

 

 2019  J30.89  Other allergic rhinitis  Allergy Injection

 

 2019  J30.2  Other seasonal allergic rhinitis  Urban Oleary M.D.

 

 2019  J30.2  Other seasonal allergic rhinitis  Allergy Injection

 

 2019  J30.81  Allergic rhinitis due to animal (cat) (dog)  Urban Oleary M.D.



     hair and dander  

 

 2019  J30.81  Allergic rhinitis due to animal (cat) (dog)  Allergy 
Injection



     hair and dander  

 

 2019  J30.1  Allergic rhinitis due to pollen  Urban Oleary M.D.

 

 2019  J30.1  Allergic rhinitis due to pollen  Allergy Injection

 

 2019  J30.89  Other allergic rhinitis  Urban Oleary M.D.

 

 2019  J30.89  Other allergic rhinitis  Allergy Injection

 

 2019  J30.2  Other seasonal allergic rhinitis  Urban Oleary M.D.

 

 2019  J30.2  Other seasonal allergic rhinitis  Allergy Injection

 

 2019  J30.81  Allergic rhinitis due to animal (cat) (dog)  Urban Oleary M.D.



     hair and dander  

 

 2019  J30.81  Allergic rhinitis due to animal (cat) (dog)  Allergy 
Injection



     hair and dander  







Plan of Treatment

Future Appointment(s):2019  8:30 am - Allergy Injection at [facility]/
2019 - ANIKET Yan-CJ30.1 Allergic rhinitis due to fqwxbmA03.89 Other 
allergic bnzwipwyW81.2 Other seasonal allergic onpfeqqnL18.81 Allergic rhinitis 
due to animal (cat) (dog) hair and danderRecommendations:Continue all 
medications as prescribed.Refrain from wearing perfumes/scented colognes while 
visitingour office. .  Continue the albuterol 2 puffs every 4 hours as needed 
for cough, wheezing, shortnessof breath or chest tightness.  Levocetirizine 1 
in the am and 1 in the pm   Ok for allergy shot today.  Continue the IT every 
week 2-3 weeks since we are in the winter Monitor Albuterol use. If using more 
than 2x/week, please call the office as your asthma medications may need to be 
adjusted.   PFT done today. Pulmonary Function Studies are done by exhaling (
blowing) into a machine to detect an asthmatic condition or other lung problem. 
Results reviewed and is normal.



Functional Status







 Description

 

 No Information Available







Mental Status







 Description

 

 No Information Available







Referrals







 Description

 

 No Information Available

## 2019-12-05 NOTE — CONS
CONSULTATION NOTE:

 

DATE OF CONSULT:  12/05/19

 

HISTORY OF PRESENT ILLNESS:  The patient is a very pleasant 64-year-old 
gentleman who was reported to have sustained a fall in his driveway on ice.  
The patient denies loss of consciousness.  Denies loss of memory.  He reports 
that he had no seizure.  He has no vision or speech difficulties.  He denies 
any neck pain or back pain.  He does have history of chronic neck and back pain
, but everything is at his baseline and no new symptoms.  The patient denies 
any weakness, numbness, or tingling of his extremities.  He ambulated after the 
fall.  He denies any urinary or GI incontinence.  The patient is retired.  He 
used to work as a  in [place] and he lives with his significant other.
  He has no children.

 

PAST MEDICAL HISTORY:  Diabetes, asthma, sleep apnea, thoracic outlet syndrome, 
GERD, BPH, kidney stone, arthritis.

 

PAST SURGICAL HISTORY:  The patient has history of a right MVD (microvascular 
decompression) of the trigeminal nerve in [facility] in 2015, has 
also history of anterior cervical diskectomy and fusion in the [facility], thoracic release surgery, kidney stones, thoracoscopic biopsy of 
mediastinal mass.

 

MEDICATIONS:  The patient is on:

1.  Albuterol.

2.  Aspirin 81 mg.

3.  Cyclobenzaprine.

4.  Fluocinonide.

5.  Levocetirizine.

6.  Losartan.

7.  Mometasone.

8.  Tegretol.

9.  Metformin.

10.  Trazodone.

 

ALLERGIES:  Environmental allergies.

 

FAMILY HISTORY:  Cardiac disease, hypertension, diabetes, colon cancer, CVA.

 

SOCIAL HISTORY:  Tobacco, negative.  Alcohol, occasionally.  Recreational drug 
use, negative.

 

PHYSICAL EXAM:  The patient is not in acute distress.  He was examined in the 
emergency room.  He is awake, alert, and oriented x3.  His pupils are equal and 
reactive.  Cranial nerves II through XII are grossly intact.  Motor 5/5 in all 
extremities.  No pronator drift.  Sensory grossly intact to light touch.  Deep 
tendon reflexes +1 bilaterally.  No clonus.  No Babinski.  Thalia's negative. 
Straight leg raise test negative in the sitting position.  The patient has no 
tenderness to palpation of the thoracic or lumbar spine.  He has free range of 
motion of the cervical spine.  His previous cervical wound is soft and dry, 
healed very well, as well as his previous incision from his microvascular 
decompression. The patient had laceration to the posterior part of his scalp, 
which was closed in the emergency room by the emergency room physician.

 

DIAGNOSTIC STUDIES:  The patient had a CT scan of the brain that revealed 
postoperative changes from the right MVD.  He does have evidence of very small 
left falcine hyperdensity suspicious for a very small subdural hematoma versus 
meningioma.

 

The patient had a CT scan of his cervical spine revealing postoperative changes 
from the right MVD with mesh placement, postoperative changes from the anterior 
cervical diskectomy and fusion with plate and interbody cage without evidence 
of fracture or subluxation.

 

ASSESSMENT:  The patient is a very pleasant 64-year-old gentleman who was 
reported to have sustained a fall with CT scan findings consistent with a 
possible left falcine small subdural hematoma.

 

PLAN:  The patient is currently doing quite well.  We were kindly consulted by 
the emergency room team because of the above CT scan findings.  At this point, 
we will recommend an MRI of the brain with and without contrast to further 
assess the nature of the hyperdensity.  If meningioma is more likely, then the 
patient will need to likely follow up in the office.  If the subdural hematoma 
is still the suspicion, the patient may be best served if he will be admitted 
for observation and have serial CT scans with repeat second CT around 8 o'clock 
at night and we would put the patient on 7 days of seizure prophylaxis with 
Keppra.  The patient also will be scheduled for x-ray of his thoracic and 
lumbar spine.  Discussed plan in details with the patient.  The patient is in 
agreement with plan.

 

Thank you for allowing us to participate in the care of this patient.  Please 
do not hesitate to contact our office in case you have any further questions or 
concerns regarding the care of this patient.

 

 [id number] #: [id number]

WILFRED

## 2019-12-05 NOTE — ED
Head Injury





- HPI Summary


HPI Summary: 


Patient is a 64-year-old male who presents emergency department for a head 

injury that occurred today, just prior to arrival.  Patient states he was 

walking his dogs up his driveway when he slipped on ice, fell back and hit the 

back of his head.  Patient does not believe he lost consciousness but states he 

felt "dazed."  Since fall patient notes headache and feels his peripheral 

vision is not as good as it typically is.  Patient is not anticoagulated.  

Symptoms are moderate in severity.  Associated symptoms of scalp laceration.  

Patient otherwise denies chest pain, shortness of breath, abdominal pain, 

vomiting.  Does not mild neck stiffness.  No current modifying factors.








- History Of Current Complaint


Chief Complaint: EDLacSutureRecheck


Stated Complaint: FALL IN DRIVEWAY


Time Seen by Provider: 12/05/19 08:19


Hx Obtained From: Patient


Pain Intensity: 5





- Allergies/Home Medications


Allergies/Adverse Reactions: 


 Allergies











Allergy/AdvReac Type Severity Reaction Status Date / Time


 


ENVIRONMENTAL Allergy  Unknown Uncoded 12/05/19 07:53





   Reaction  





   Details  











Home Medications: 


 Home Medications





Albuterol inh POWDER (NF) [Proair Respiclick] 2 puff INH Q4H PRN 12/05/19 [

History Confirmed 12/05/19]


Aspirin EC TAB* [Ecotrin EC Low Dose 81 MG*] 81 mg PO DAILY 12/05/19 [History 

Confirmed 12/05/19]


Cyclobenzaprine TAB* [Flexeril 10 MG TAB*] 10 mg PO TID PRN 12/05/19 [History 

Confirmed 12/05/19]


Fluocinonide 0.05% CM(NF) [Lidex 0.05% CREAM(NF)] 1 applic TOPICAL TID 12/05/19 

[History Confirmed 12/05/19]


LevoCETirizine TAB (NF) [Xyzal TAB (NF)] 5 - 10 mg PO DAILY 12/05/19 [History 

Confirmed 12/05/19]


Losartan TAB* [Cozaar TAB*] 25 mg PO DAILY 12/05/19 [History Confirmed 12/05/19]


Metformin HCl [Metformin HCl ER] 500 mg PO DAILY 12/05/19 [History Confirmed 12/ 05/19]


Mometasone Furoate 0.1 % TOPICAL DAILY 12/05/19 [History Confirmed 12/05/19]


traZODone TAB* [Desyrel TAB*] 50 mg PO BEDTIME 12/05/19 [History Confirmed 12/05 /19]











PMH/Surg Hx/FS Hx/Imm Hx


Previously Healthy: Yes


Endocrine/Hematology History: Reports: Hx Diabetes - type 2


Cardiovascular History: Reports: Other Cardiovascular Problems/Disorders


   Denies: Hx Hypertension, Hx Pacemaker/ICD


Respiratory History: Reports: Hx Asthma - HX OF, NO MENTION OF IN SEVERAL YEARS 

PER PATIENT, Hx Seasonal Allergies, Hx Sleep Apnea - current BiPAP user, Other 

Respiratory Problems/Disorders - Thoracic Outlet Syndrome


GI History: Reports: Hx Gastroesophageal Reflux Disease - TYPE II ON ORAL 

MEDICATION FOR


 History: Reports: Hx Benign Prostatic Hyperplasia, Hx Kidney Stones


Musculoskeletal History: Reports: Hx Arthritis - RIGHT ANKLE, Hx Back Problems, 

Other Musculoskeletal History - Chronic Leg Pain; Cervical Stenosis with 

Mylopathy


Sensory History: Reports: Hx Contacts or Glasses - GLASSES


   Denies: Hx Hearing Aid


Opthamlomology History: Reports: Hx Contacts or Glasses - GLASSES


Neurological History: Reports: Hx Headaches - COUPLE TIMES A WEEK, Hx Migraine 

- COUPLE TIMES A WEEK- STATES HAS A CALL INTO MD TO UP MEDICATION, Hx Nerve 

Disease - ATYPICAL TRIGEMINAL NEURALGIA, Other Neuro Impairments/Disorders


Psychiatric History: 


   Denies: Hx Panic Disorder





- Surgical History


Surgery Procedure, Year, and Place: Throacic Release Surgery 2007; Cervical 

Fusion 8/15/13 in [facility].  kidney stones.  thoracoscopic biopsy of mass 

of lt mediastinum


Hx Anesthesia Reactions: Yes - SEE NOTE IN MISC COMMENT





- Immunization History


Date of Tetanus Vaccine: UTD


Date of Influenza Vaccine: 2013


Infectious Disease History: No


Infectious Disease History: 


   Denies: Traveled Outside the [place] in Last 30 Days





- Family History


Known Family History: Positive: Cardiac Disease, Hypertension, Diabetes, Other 

- colon cancer, cva





- Social History


Occupation: Employed Full-time


Lives: With Family


Alcohol Use: Occasionally


Substance Use Type: Reports: None


Smoking Status (MU): Never Smoked Tobacco





Review of Systems


Positive: Blurred Vision


ENT: Negative


Positive: Other


Negative: Palpitations, Chest Pain


Respiratory: Negative


Negative: Shortness Of Breath


Gastrointestinal: Negative


Negative: Abdominal Pain, Vomiting


Positive: Other - neck stiffness


Positive: Other - scalp laceration


Negative: Headache, Weakness, Paresthesia, Numbness


All Other Systems Reviewed And Are Negative: Yes





Physical Exam


Triage Information Reviewed: Yes


Vital Signs On Initial Exam: 


 Initial Vitals











Temp Pulse Resp BP Pulse Ox


 


 98.7 F   76   20   144/85   95 


 


 12/05/19 07:53  12/05/19 07:53  12/05/19 07:53  12/05/19 07:53  12/05/19 07:53











Vital Signs Reviewed: Yes


Appearance: Positive: Well-Appearing - Patient sitting up in bed in no acute 

distress.  Pleasant.  Family member present.  Answers questions properly.


Skin: Positive: Warm, Dry


Head/Face: Positive: Other - To centimeter irregular-shaped laceration noted to 

the posterior scalp with mild active bleeding.  Superficial laceration noted 

laterally.


Eyes: Positive: Normal, EOMI, BEVERLY, Conjunctiva Clear


ENT: Positive: TMs normal


Neck: Positive: Supple, Other: - Mild paraspinal tenderness on the right.  No 

midline tenderness.


Respiratory/Lung Sounds: Positive: Clear to Auscultation, Breath Sounds Present


Cardiovascular: Positive: Normal, RRR


Musculoskeletal: Positive: Normal, Strength/ROM Intact


Neurological: Positive: Normal, Alert, Oriented to Person Place, Time, CN 

Intact II-III, Normal Gait, Finger to Nose - Normal, Speech Normal.  Negative: 

Cerebellar Dysfunction, Disoriented, Facial Droop, Pronator Drift Present


Psychiatric: Positive: Affect/Mood Appropriate





- Chika Coma Scale


Best Eye Response: 4 - Spontaneous


Best Motor Response: 6 - Obeys Commands


Best Verbal Response: 5 - Oriented


Coma Scale Total: 15





Procedures





- Sedation


Patient Received Moderate/Deep Sedation with Procedure: No





- Laceration/Wound Repair


  ** 1


Location: head


Description: Irregular


Anesthesia: Local, 1.0%, Lido


Length, Depth and Shape: 1cm irregular


Betadine Prep?: Yes


Laceration/Wound Explored: clean


Closure: Staples #__ - 3


Layer Closure?: No


Sterile Dressing Applied?: Yes





Diagnostics





- Vital Signs


 Vital Signs











  Temp Pulse Resp BP Pulse Ox


 


 12/05/19 07:53  98.7 F  76  20  144/85  95














- Laboratory


Result Diagrams: 


 12/05/19 11:24





 12/05/19 11:24


Lab Statement: Any lab studies that have been ordered have been reviewed, and 

results considered in the medical decision making process.





Head Injury Course/Dx


Course Of Treatment: Patient presenting with head injury after a mechanical 

fall.  Given his age, posterior head injury, headache he scan brain was 

obtained to rule out skull fracture, bleed.  Tetanus was updated.  Scalp 

laceration was repaired as noted above by Sabina Falk.  CT neck shows 

chronic change without acute findings per radiology.  CT brain per radiology: 

IMPRESSION:  1.  A 3 mm hyperdense focus extending left laterally from the 

anterior falx cerebri.  Although this could be representative of a meningioma, 

short-term follow-up imaging is.  recommended to document stability in the 

context of trauma (rule out subdural hematoma).  2.  Postoperative changes to 

the right posterior fossa.  These findings were discussed with VICTOR MANUEL Rutherford 

at 9:50 AM on December 05, 2019.  Pt. re-examined without any neuro deficits. 

Case discussed with neurosx, Dr. Mak, and he would like brain MRI w/ and 

w/o contrast as well as T and L spine xrays. MRI tech notes they will need OR 

reports from pt.'s prior neck and face sx to see if he is safe for MRI. Records 

being obtained from [id number] .  Pt. examined eventually examined in ED by Dr. Mak. MRI is backed up and will not be able to perform MRI until after 7 

pending Operative reports from [facility]. Neurosx recommends admission for potential 

MRI or repeat CT scan. Case discussed with Dr. Bae, hospitalist, who will 

see pt. for admission.





- Diagnoses


Differential Diagnosis/HQI/PQRI: Cerebral Contusion, Cervical Sprain, 

Concussion Without LOC, Hematoma, Intracranial Bleed


Provider Diagnoses: 


 Head injury








Discharge ED





- Sign-Out/Discharge


Documenting (check all that apply): Patient Departure


All imaging exams completed and their final reports reviewed: No





- Discharge Plan


Condition: Stable


Disposition: ADMITTED TO [facility]





- Billing Disposition and Condition


Condition: STABLE


Disposition: Admitted to [facility]





- Attestation Statements


Provider Attestation: 





pt seen by midlevel provider independently, based on their assessment, it was 

not necessary to present the case to me but I was available for consultation. I 

did not form a physician-patient relationship with the patient. The chart 

however, has been reviewed. am signing this note strictly in an administrative 

capacity.

## 2019-12-05 NOTE — HP
CC:  Dr. Jeremy Thurman *

 

HISTORY AND PHYSICAL:

 

DATE OF ADMISSION:  19

 

PRIMARY CARE PROVIDER:  Dr. Jeremy Thurman.

 

ATTENDING PHYSICIAN:  Dr. Lucie Bae * (dictated by VICTOR MANUEL Michael).

 

CHIEF COMPLAINT:  "I fell on ice in my driveway."

 

HISTORY OF PRESENT ILLNESS:  Mr. Reyna is a 64-year-old male with a past medical 
history of diabetes mellitus type 2, non-insulin-dependent; hyperlipidemia; 
obstructive sleep apnea; obesity; chronic pain, who presented to the ER today 
after he slipped and fell on ice in his driveway and hit his head.  He denies 
overt injury to the neck, but does note that he has neck pain as well as pain 
in the back of his head.  He complains of pain at the base of the neck and 
states that he has chronic pain in the neck, but that it is more "achy" than 
usual.  He notes that he had tunnel vision at first, but this has since 
subsided.  He has a right anterior headache with left-sided pain in the head 
that he says is not similar to a headache.  He has chronic neck and back pain, 
and his lower back pain has not worsened since his fall.  He denies pain 
elsewhere and notes that he is fully able to move all of his extremities.  He 
did not lose consciousness with the fall.  He sustained no other injuries.  It 
was in fact a mechanical fall.

 

In the ER, the patient received a full workup including CBC and CMP, which were 
within normal limits.  Imaging of the brain revealed subdural hematoma versus 
meningioma.  CT of the C-spine is negative for fractures.  T and L-spine x-rays 
revealed degenerative disk disease without fracture.  In the ER, the patient 
received a tetanus shot.  He also received lidocaine local anesthetic injection 
and then subsequently got 3 staples inserted to the skin of the posterior 
aspect of his head.  The hospitalist team was asked to evaluate the patient due 
to concern for subdural hematoma.

 

PAST MEDICAL HISTORY:

1.  Diabetes mellitus, not on insulin.

2.  Hyperlipidemia.

3.  Thoracic outlet syndrome.

4.  GERD.

5.  BPH.

6.  Chronic pain.

7.  Migraines.

8.  Obstructive sleep apnea.

9.  Asthma.

10.  Arthritis.

11.  History of nephrolithiasis.

 

PAST SURGICAL HISTORY:

1.  Microvascular decompression of the trigeminal nerve, anterior cervical 
diskectomy/fusion.

2.  Thoracic release.

3.  Biopsy of mediastinal mass, which was benign.

4.  SI mass removal, benign.

5.  Lithotripsy.

 

HOME MEDICATIONS:

1.  Albuterol 2 puffs inhalation q.8 hours p.r.n. shortness of breath/wheeze.

2.  Amitriptyline 50 mg p.o. at bedtime.

3.  Aspirin 81 mg p.o. daily.

4.  Atorvastatin 80 mg p.o. daily.

5.  Carbamazepine 100 mg p.o. b.i.d., 200 p.o. b.i.d.

6.  Cholecalciferol 1000 units p.o. daily.

7.  Cyclobenzaprine 10 mg p.o. t.i.d. p.r.n. muscle spasm.

8.  Lidex cream 1 application topically t.i.d.

9.  Gabapentin 300 mg p.o. b.i.d.

10.  Ibuprofen 400 mg p.o. q.6 hours p.r.n.

11.  Levocetirizine 5 to 10 mg p.o. daily.

12.  Losartan 25 mg p.o. daily.

13.  Metformin 500 mg p.o. daily.

14.  Mometasone furoate 1% topically daily.

15.  Trazodone 50 mg p.o. at bedtime.

16.  Zolpidem 6.25 to 12.5 mg p.o. at bedtime.

 

ALLERGIES:  No known drug allergies.  Environmental allergies.

 

FAMILY HISTORY:  Mother had permanent pacemaker, unknown reason; diabetes 
mellitus and is  from CVA at the age of 68.  Father had diabetes 
mellitus and is  from colon cancer with metastasis to the liver at the 
age of 65.  Sister, brother and maternal grandfather had diabetes mellitus.  No 
family history of heart disease.

 

SOCIAL HISTORY:  The patient denies current or former use of tobacco.  He 
drinks weekly.  He is a retired [employer] .  He is not , but has 
a long-term partner.  He has no children.  He lives with his partner.  In the 
event that he is unable to make his own medical decisions, he has appointed his 
partner, Les Brewster, to be his surrogate decision maker.

 

REVIEW OF SYSTEMS:  A 14-point review of systems has been performed and all the 
pertinent positives and negatives are in the HPI.  All other systems are 
negative.

 

                               PHYSICAL EXAMINATION

 

GENERAL:  Mr. Reyna is a well-developed, well-nourished, obese, middle-aged 
white male, who is sitting up in bed.  He appears to be in no acute distress.  
He is pleasant and cooperative.  He appears comfortable.

 

HEENT:  PERRL.  EOMI.  Nonicteric sclerae.  The patient wears glasses.  Hearing 
is grossly intact.  Oral mucous membranes are moist.  There are no lesions.  
The pharynx is clear.  The tongue is at midline.  The palate elevates 
symmetrically. There are two 0.5 cm lacerations to the posterior head that have 
required 3 staples.  There are smaller lacerations around this area with 
surrounding ecchymosis.

 

PULMONARY:  Symmetrical chest expansion without use of accessory muscles.  
Clear to auscultation bilaterally without rhonchi, wheeze, rales.  No digital 
clubbing or cyanosis.

 

CARDIOVASCULAR:  Regular rate and rhythm with S1, S2 present without murmurs, 
rubs, clicks, or gallops.  There is no JVD or peripheral edema.  Radial and 
pedal pulses are palpable.

 

ABDOMEN:  Obese.  Bowel sounds in all quadrants.  Soft, nontender to palpation.

 

MUSCULOSKELETAL:  Full range of motion without pain or deformities.

 

NEURO:  The patient is awake.  He is alert and oriented x3.  Cranial nerves II 
through XII are grossly intact.  There are no focal neurological deficits.  
Muscle strength 5/5 bilaterally in the upper and lower extremities.

 

 DIAGNOSTIC STUDIES/LAB DATA:

1.  Brain CT, impression:  A 3 mm hyperdense focus extending left laterally 
from the anterior falx cerebri.  Although this could be representative of a 
meningioma, short-term followup imaging is recommended to document stability in 
the context of trauma (rule out subdural hematoma).  Postoperative changes to 
the right posterior fossa.

2.  CT cervical spine, impression:  No fracture or traumatic malalignment of 
the cervical spine.  Varying degrees of multilevel spondylosis results in up to 
moderate right neural foraminal stenosis at C3-C4.  There is no severe osseous 
encroachment of the spinal canal.  Status post C4-C5 ACDF.  Status post right 
occipital cranioplasty.

3.  Thoracic spine x-ray, impression:  Degenerative disk disease.

4.  Lumbar spine x-ray, impression:  Degenerative disk disease at L3-L4 and L4-
L5.

 

ASSESSMENT AND PLAN:  Mr. Reyna is a 64-year-old male with a past medical 
history of diabetes mellitus, non-insulin-dependent; hyperlipidemia; 
obstructive sleep apnea; obesity; chronic pain, who presented to the ER today 
status post fall on ice and was found to have abnormality on CT of the brain 
revealing subdural hematoma versus meningioma.  Due to this concern, he will be 
admitted observation for:



1.  Mechanical fall resulting in head injury with abnormality noted on CT.  The 
patient sustained a mechanical fall when he slipped and fell on ice.  He hit 
the posterior aspect of his head.  A CT was obtained and revealed subdural 
hematoma versus meningioma.  Neurosurgery was consulted and recommended MRI of 
the brain with and without contrast.  It is uncertain if the patient is able to 
receive this due to the hardware in his neck.  We are requesting records 
pertaining to the surgery; pending results, he will have an MRI of the brain.  
If he is unable to have an MRI of the brain, a repeat CT of the head will be 
performed at  today.  Neurosurgery recommends serial head CTs if these is 
high suspicion for a subdural hematoma.  We will start the patient on seizure 
prophylaxis with Keppra x7 days per recommendation of Neurosurgery.  Seizure 
precautions will be ordered.  Neuro checks will be ordered.  His home aspirin 
will be held at this time.  He states he has no history of coronary artery 
disease or cerebrovascular accident, but takes aspirin daily.

2.  Diabetes mellitus.  Continue home medication, metformin.

3.  Hyperlipidemia.  Continue home atorvastatin.

4.  Chronic pain.  Continue gabapentin, carbamazepine, cyclobenzaprine.  We 
will hold ibuprofen at this time.

5.  History of migraines.  Amitriptyline at bedtime.

6.  Sleep difficulties.  At this time, we will hold the patient's zolpidem. 
Trazodone will be continued.

7.  Obstructive sleep apnea.  Continue CPAP.  The patient will use hospital 
equipment at this time as he does not currently have his CPAP with him.

8.  DVT prophylaxis:  According to DVT Risk Assessment, the patient scores 3, 
placing him at high risk.  At this time, he will be placed on SCDs with no 
chemoprophylaxis in the setting of possible subdural hematoma.

9.  Code status:  Full code.

 

TIME SPENT:  Approximately 55 minutes was spent on this admission, greater than 
half that time was spent face-to-face with the patient obtaining history, 
performing physical, and reviewing the plan of care.

 

The case has been reviewed with my attending, Dr. Bae, who is in agreement 
with the plan of care.

 

 ____________________________________ 

VICTOR MANUEL RIVAS

 

[id number]/CPS #: [id number]

[facility] Center[facility]НА

## 2019-12-06 VITALS — DIASTOLIC BLOOD PRESSURE: 75 MMHG | SYSTOLIC BLOOD PRESSURE: 132 MMHG

## 2019-12-06 LAB
BASOPHILS # BLD AUTO: 0.1 10^3/UL (ref 0–0.2)
EOSINOPHIL # BLD AUTO: 0.3 10^3/UL (ref 0–0.6)
HCT VFR BLD AUTO: 42 % (ref 42–52)
HGB BLD-MCNC: 14.8 G/DL (ref 14–18)
LYMPHOCYTES # BLD AUTO: 1.9 10^3/UL (ref 1–4.8)
MCH RBC QN AUTO: 33 PG (ref 27–31)
MCHC RBC AUTO-ENTMCNC: 35 G/DL (ref 31–36)
MCV RBC AUTO: 94 FL (ref 80–94)
MONOCYTES # BLD AUTO: 0.6 10^3/UL (ref 0–0.8)
NEUTROPHILS # BLD AUTO: 5.5 10^3/UL (ref 1.5–7.7)
NRBC # BLD AUTO: 0 10^3/UL
NRBC BLD QL AUTO: 0.2
PLATELET # BLD AUTO: 161 10^3/UL (ref 150–450)
RBC # BLD AUTO: 4.52 10^6 /UL (ref 4.18–5.48)
WBC # BLD AUTO: 8.3 10^3/UL (ref 3.5–10.8)

## 2019-12-06 RX ADMIN — CARBAMAZEPINE SCH MG: 200 TABLET ORAL at 08:48

## 2019-12-06 RX ADMIN — LEVETIRACETAM SCH MG: 500 TABLET, FILM COATED ORAL at 08:49

## 2019-12-06 RX ADMIN — GABAPENTIN SCH MG: 300 CAPSULE ORAL at 08:48

## 2019-12-06 NOTE — DS
CC:  Dr. Jeremy Thurman *

 

DISCHARGE SUMMARY:

 

DATE OF ADMISSION:  12/05/19

 

DATE OF DISCHARGE:  12/06/19

 

PROVIDER:  Tariq Flower NP

 

PRIMARY CARE PHYSICIAN:  Dr. Jeremy Thurman.

 

ATTENDING PHYSICIAN:  Dr. Lucie Bae * (dictated by Tariq Flower NP).

 

CONSULTING PROVIDER:  Dr. Mak, Neurosurgery.

 

PRIMARY DISCHARGE DIAGNOSES:

1.  Mechanical fall.

2.  Head injury.

3.  Laceration to the back of head.

4.  Abnormality seen on CT, noted to be consistent with dural ossification seen 
on MRI.

 

SECONDARY DISCHARGE DIAGNOSES:

1.  Non-insulin-dependent diabetes mellitus.

2.  Hyperlipidemia.

3.  Thoracic outlet syndrome.

4.  Gastroesophageal reflux disease.

5.  Benign prostatic hypertrophy.

6.  Chronic pain.

7.  Migraines.

8.  Obstructive sleep apnea, on continuous positive airway pressure.

9.  Asthma.

10.  Arthritis.

11.  History of nephrolithiasis.

 

HOME MEDICATIONS AT DISCHARGE:

1.  Carbamazepine 300 mg b.i.d.

2.  Metformin 500 mg daily.

3.  Gabapentin 300 mg b.i.d.

4.  Atorvastatin 80 mg daily.

5.  Flexeril 10 mg t.i.d. p.r.n.

6.  Amitriptyline 50 mg at bedtime.

7.  Albuterol 2 puffs inhaled q.4 hours p.r.n.

8.  Trazodone 50 mg at bedtime.

9.  Xyzal 5 to 10 mg daily.

10.  Ibuprofen 400 mg q.6 hours.

11.  Mometasone 0.1% topical daily.

12.  Cholecalciferol 1000 units daily.

13.  Fluocinonide 1 application topical t.i.d.

14.  Losartan 25 mg daily.

15.  Zolpidem CR 6.25 to 12.5 mg at bedtime p.r.n.

16.  Tylenol 650 q.4 hours p.r.n.

17.  The patient has been advised to hold home aspirin for 1 week and hold home 
metformin for 48 hours after receiving contrast.

 

HOSPITAL COURSE OF STAY:  For full details, please refer to the H and P 
provided by VICTOR MANUEL Michael on 

12/05/19 on admission.  In summary, Mr. Reyna is a 64-year-old male who 
presented to the ER after he slipped and fell on ice in his driveway and hit 
his head.  He slipped and fell backwards and sustained an injury to the 
posterior portion of his head and had lacerations noted in the ER.  He 
appropriately had a full workup which included CT of the brain and CT and x-
rays of the neck and spine.  CT of the C-spine was negative for fractures and T 
and L spine x-rays revealed degenerative disk disease without acute fracture.  
He did receive a tetanus shot, and he also was noted to have a concern for an 
abnormality on his CT scan of the brain which was concerning for subdural 
hematoma versus meningioma.

 

Mr. Reyna was seen in consultation by Neurosurgery and placed prophylactically 
on Keppra and seizure precaution.  He was recommended to have an MRI with and 
without contrast of the brain which was done on 12/06/19, after he was 
appropriately cleared for his previous cervical spine hardware from previous 
surgery.  Mr. Reyna had an MRI and the report showed a few scattered nonspecific 
white matter changes. No acute MRI pathology of the visualized portion of the 
brain.  The area of concern noted on CT appears to correspond to a small area 
of dural ossification.  Mr. Reyna has been observed overnight and through the 
day with neuro checks and seizure precautions.  He has been asymptomatic.  
Denies any residual head pain, headache. He is tolerating food.  He has no 
nausea or vomiting.  Denies visual changes.  No other neurological concerns.  
He did have 3 staples that were placed in the ER for his laceration to the back 
of the head and these were dislodging themselves from the wound and so they 
were removed so that he could get the MRI completed.  The staples were removed 
and he tolerated this well.  The lacerations were stable. Steri-Strips were 
placed and antibiotic ointment was applied, and he and his partner have 
received teaching as to how to take care of this at home.  He is advised to see 
his PCP within 7 to 10 days for wound check as well as follow up for his head 
injury.  He has verbalized understanding of all these instructions.

 

OUTPATIENT FOLLOWUP NEEDS:  Again, he should follow up with his PCP within 7 to 
10 days.  He has been advised of the wound care and what to monitor for and 
when to return for evaluation if there is concern for drainage, inappropriate 
wound healing, pain, redness, warmth, or tenderness.  He has also been advised 
to monitor for persistent headache or other signs of postconcussive syndrome 
which includes vision change, nausea, vomiting, and difficulty focusing, memory 
issues whenever neurological changes.  Again, he is advised to hold his 
metformin for 48 hours.  He has also been advised to hold his aspirin for the 
next week.

 

DISCHARGE DIET:  Consistent carb diabetic diet.

 

ACTIVITIES:  As tolerated.  Avoid strenuous activity.

 

CONDITION:  Stable.

 

DISPOSITION:  To home.

 

TIME SPENT:  Approximately 40 minutes was spent on this discharge.

 

This is only a brief summary of the patient's hospital course of stay.  For 
full details, please refer to the full medical records.  If you have any 
further questions or concerns, please feel free to contact me at [phone].

 

____________________________________ 

TARIQ FLOWER NP

 

[id number]/CPS #: [id number]

WILFRED

## 2019-12-06 NOTE — PN
Subjective


Date of Service: 12/06/19


Interval History: 





Patient seen and examined at bedside. Partner, Yuri, is present.





Mr. Reyna is a 65 yo male, seen today after admission on 12/5/19 following a 

fall on the ice where he fell on the ice and hit the back of his head. He is 

here for observation after CT revealed question of subdural hematoma vs. 

meningioma. This morning, Mr. Reyna denies any head pain or headache, denies 

weakness, visual changes, nausea/vomiting, speech difficulties, CP, SOB. He was 

seen in consultation by neurosurgery yesterday, who recommended MRI w/ and w/

out contrast. However, he has a history of trigeminal nerve microvascular 

decompression and ACDF with hardware that still needs to be cleared by the MRI 

staff. 


He also has 3 head staples that were placed yesterday that would preclude him 

from proceeding with the MRI.





Tele: NSR 60s








Family History: Unchanged from Admission


Social History: Unchanged from Admission


Past Medical History: Unchanged from Admission





Objective


Active Medications: 








Acetaminophen (Tylenol Tab*)  650 mg PO Q4H PRN


   PRN Reason: mild to moderate pain


Albuterol (Ventolin Hfa Inhaler*)  2 puff INH Q4H PRN


   PRN Reason: SOB/WHEEZING


Amitriptyline HCl (Elavil Tab*)  50 mg PO BEDTIME [facility]


   Last Admin: 12/05/19 21:27 Dose:  50 mg


Atorvastatin Calcium (Lipitor*)  80 mg PO DAILY [facility]


   Last Admin: 12/06/19 08:48 Dose:  80 mg


Carbamazepine (Tegretol Tab(*))  300 mg PO BID [facility]


   Last Admin: 12/06/19 08:48 Dose:  300 mg


Cetirizine HCl (Zyrtec*)  10 mg PO DAILY [facility]


   Last Admin: 12/06/19 08:48 Dose:  10 mg


Cyclobenzaprine HCl (Flexeril Tab*)  10 mg PO TID PRN


   PRN Reason: SPASMS - MUSCLE


Gabapentin (Neurontin Cap(*))  300 mg PO BID [facility]


   Last Admin: 12/06/19 08:48 Dose:  300 mg


Levetiracetam (Keppra Tab*)  500 mg PO BID [facility]


   Last Admin: 12/06/19 08:49 Dose:  500 mg


Losartan Potassium (Cozaar Tab*)  25 mg PO DAILY [facility]


   Last Admin: 12/06/19 08:48 Dose:  25 mg


Metformin HCl (Glucophage*)  250 mg PO BID [facility]


   Last Admin: 12/06/19 08:49 Dose:  250 mg


Miscellaneous (Ativan Pyxis Collier)  1 ea N/A .ATIVAN IV KEY PRN


   PRN Reason: PYXIS KEY








 Vital Signs - 8 hr











  12/06/19 12/06/19 12/06/19





  03:15 07:15 08:41


 


Temperature 99.3 F 97.7 F 


 


Pulse Rate 66 59 78


 


Respiratory 18 20 





Rate   


 


Blood Pressure 114/60 130/68 





(mmHg)   


 


O2 Sat by Pulse 95 95 





Oximetry   














  12/06/19





  08:48


 


Temperature 


 


Pulse Rate 


 


Respiratory 18





Rate 


 


Blood Pressure 





(mmHg) 


 


O2 Sat by Pulse 





Oximetry 











Oxygen Devices in Use Now: None


Appearance: 65 yo male, OOB to chair, pleasant, conversive, NAD


Eyes: No Scleral Icterus, PERRLA


Ears/Nose/Mouth/Throat: Clear Oropharnyx, Mucous Membranes Moist


Neck: NL Appearance and Movements; NL JVP


Respiratory: Symmetrical Chest Expansion and Respiratory Effort, Clear to 

Auscultation


Cardiovascular: NL Sounds; No Murmurs; No JVD, RRR


Abdominal: NL Sounds; No Tenderness; No Distention


Extremities: No Edema, No Clubbing, Cyanosis


Skin: - - ecchymosis to occiput with small 0.5 cm lacerations noted - no active 

bleeding. 3 staples noted, one staple has disolodged itself. 


Neurological: Alert and Oriented x 3, NL Gait, NL Muscle Strength and Tone, - - 

CN II-XII grossly intact, no focal deficits. Strength equal and 5/5 in upper 

and lower extremities


Lines/Tubes/Other Access: Clean, Dry and Intact Peripheral IV


Nutrition: Taking PO's


Result Diagrams: 


 12/06/19 05:22





 12/05/19 11:24


Diagnostic Imaging: 





CT Brain w/out contrast





INDICATION:  Head injury.





COMPARISON:  There are no relevant prior studies available for comparison.





TECHNIQUE: Contiguous axial sections of the brain were obtained from the skull 

base to the


vertex without contrast.





FINDINGS:





A 3 mm hyperdense focus extends left laterally from the anterior falx cerebri.





Otherwise, there is no intra-axial hemorrhagic focus, mass effect or midline 

shift. The


gray-white matter differentiation is grossly maintained without abnormal 

cerebral edema.


Periventricular hypoattenuation, without mass effect, is nonspecific.





The ventricles are of conventional size and configuration. The basal cisterns 

are patent.


There is no abnormal extra axial collection.





The globes and orbits are symmetric. The paranasal sinuses and mastoid air 

cells are


predominantly well aerated. Postoperative changes related to prior right 

occipital


cranioplasty.





IMPRESSION:





1.  A 3 mm hyperdense focus extending left laterally from the anterior falx 

cerebri.


Although this could be representative of a meningioma, short-term follow-up 

imaging is


recommended to document stability in the context of trauma (rule out subdural 

hematoma).





2.  Postoperative changes to the right posterior fossa.





These findings were discussed with VICTOR MANUEL Rutherford at 9:50 AM on December 05, 2019.








____________________________________________________________


<Electronically signed by Kwame Dow MD in [place]>  12/05/19 0953


Dictated By: Kwame Dow MD


Dictated Date/Time: 12/05/19 0939


Transcribed Date/Time: 12/05/19 0939








********************************************************************************

****





Head CTA








ADDENDUM





Addendum created by Jermaine Garrido MD on 12/5/2019 10:34:09 PM EST 





Right posterior cerebral artery: Fetal origin of the right posterior cerebral 


artery. 


Initial report created on 12/5/2019 10:33:45 PM EST 





PROCEDURE INFORMATION: 


Exam: CT Angiography Head With Contrast 


Exam date and time: 12/5/2019 9:48 PM 


Age: 64 years old 


Clinical history: Injury or trauma; Fall; Initial encounter; Wound, open; Not 


specified; Head; Additional info: Abnormal results 





TECHNIQUE: 


Imaging protocol: Computed tomography angiography of the head with intravenous 


contrast. 


3D rendering: MIP reconstructed images were created and reviewed. 


Radiation optimization: All CT scans at this facility use at least one of these 


dose optimization techniques: automated exposure control; mA and/or kV 


adjustment per patient size (includes targeted exams where dose is matched to 


clinical indication); or iterative reconstruction. 


Contrast material: VISI 320; Contrast volume: 60 ml; Contrast route: IV;  





COMPARISON: 


BRAIN WO CT BRAIN WO 12/5/2019 9:21 AM 





FINDINGS: 





Right internal carotid artery: Mild calcification involving the right carotid 


siphon without stenosis. 


Right anterior cerebral artery: Hypoplastic right A1 segment. 


Right middle cerebral artery: Unremarkable. No occlusion or significant 


stenosis. No aneurysm.  


Right posterior cerebral artery: The origin of the right posterior cerebral 


artery. 


Right vertebral artery: Unremarkable. No occlusion or significant stenosis. No 


aneurysm.  





Left internal carotid artery: Mild calcification involving the left carotid 


siphon without significant stenosis. 


Left anterior cerebral artery: Unremarkable. No occlusion or significant 


stenosis. No aneurysm.  


Left middle cerebral artery: Unremarkable. No occlusion or significant 


stenosis. No aneurysm.  


Left posterior cerebral artery: Unremarkable. No occlusion or significant 


stenosis. No aneurysm.  


Left vertebral artery: Left vertebral artery is dominant. 


Basilar artery: Unremarkable. No occlusion or significant stenosis. No 

aneurysm. 





HEAD: 


Brain: Small left parafalcine hyperdensity on prior is stable. 





IMPRESSION: 


1. Negative for aneurysm. 


2. No significant stenosis or large vessel occlusion. 


3. Small left parafalcine hyperdensity on prior is poorly stable. 





To contact [facility] with a general question: [facility] - [phone]


For direct physician to physician contact: Physician Hotline - [phone]


[facility] ([facility] Facility ID #[id number])


<Electronically signed by Jermaine Garrido MD in [place]>12/05/19 2234





Dictated by: Jermaine Garrido MD


Dictated Date/Time:12/05/19 2148


Transcribed Date/Time: 12/05/19 2148


Copy to: Ta Balderas MD; Lucie Thurman MD











Assess/Plan/Problems-Billing


Assessment: 





Mr. Reyna is a 65 yo male with a PMH significant for NIDDM2, HLD, PAT, and 

chronic pain who presented to the ER on 12/5/19 following a fall on ice and was 

found to have an abnormality on CT of the brain revealing subdural hematoma 

versus meningioma. 











- Patient Problems


(1) Abnormal CT of brain


Code(s): R90.89 - [facility] ABNORMAL FINDINGS ON DIAGNOSTIC IMAGING OF CNSL   Comment

: 


Pt is s/p mechanical fall with posterior head injury and abnormality on CT.


Question of subdural hematoma vs meningioma seen on the initial CT. 


Appreciate neurosurgery consult - MRI w/ and w/out contrast preferred; however 

pt has history of hardware and clips.


If unable to obtain MRI, neurosurgery recommends repeat CT scan today and again 

on 12/7. 


Awaiting records from [place] to see if we can proceed MRI.


Continue Keppra x 7 days, as per neurosurgery.


Continue seizure precautions, neuro checks. Hold home ASA.   





(2) Laceration of head


Code(s): S01.91XA - LACERATION W/O FOREIGN BODY OF UNSP PART OF HEAD, INIT   

Comment: 


3 staples placed in ED. Staples have loosened and are not helping laceration at 

this point.


Staples removed, pt tolerated well. There is evidence of abrasion with skin 

flap.


Other lacerations are stable, well approximated, no active bleeding.


Steri-strips placed using aseptic technique. Antibiotic ointment applied.   





(3) Type 2 diabetes mellitus


Comment: 


On metformin at home


Received contrast overnight for CTA, stop metformin


Pt will also need contrast for MRI, if able to complete today


Start FSBG with Lispro SSI


May resume metformin 48 hours after last contrast administration   





(4) HLD (hyperlipidemia)


Code(s): E78.5 - HYPERLIPIDEMIA, UNSPECIFIED   Comment: 


Continue home atorvastatin.   





(5) Chronic pain


Code(s): G89.29 - OTHER CHRONIC PAIN   Comment: 


Continue home gabapentin, cyclobenzaprine.


Continue carbamazepine for trigeminal nerve pain.   





(6) PAT (obstructive sleep apnea)


Code(s): G47.33 - OBSTRUCTIVE SLEEP APNEA (ADULT) (PEDIATRIC)   Comment: 


Uses CPAP


May use home machine.   





(7) Migraine


Code(s): G43.909 - MIGRAINE, UNSP, NOT INTRACTABLE, WITHOUT STATUS MIGRAINOSUS 

  Comment: 


Currently denies but history of migraines.


Continue amitriptyline at bedtime.   





(8) Insomnia


Code(s): G47.00 - INSOMNIA, UNSPECIFIED   Comment: 


Continue home trazodone, hold home zolpidem.   





(9) DVT prophylaxis


Code(s): Z29.9 - ENCOUNTER FOR PROPHYLACTIC MEASURES, UNSPECIFIED   Comment: 


SCDs


No chemoprophylaxis in the setting of potential subdural hematoma   





(10) Full code status


Code(s): Z78.9 - OTHER SPECIFIED HEALTH STATUS   


Status and Disposition: 





OBV status. Anticipate d/c to home when medically stable.


Attending: Lucie Bae